# Patient Record
(demographics unavailable — no encounter records)

---

## 2025-03-18 NOTE — DATA REVIEWED
[FreeTextEntry1] : 2/25  A1c 8.3%, trig 270, HDL 40, LDL 64, Ca 9.1, GFR 96 9/24: A1c 8.1%, HDL 49, LDL 72, urine alb < 1.2, TSH 1.67, Ca 10.2, PTH 74, BSAP 15.8. 9/23  Ca 10.8, GFR 92, phos 2.8, urine alb < 1.2 8/23  A1c 8.7%, fructosamine 369, tot chol 159, trig 210, HDL 54, LDL 70, 25D 24.9, TSH 1.45, BSAP 21.7 5/23  A1c 8.4% POC 4/23  Ca 10.7,  GFR 96, PTH 81 3/23  Ca 10.2,  9/22  A1c 6.4%  bone density, 12/20 ([facility]) fem neck T -2.9, prev -2.8 (2019) tot hip T -2.5, prev -2.1 (2019) L2-L3  T -2.2, prev -2.4 (2019)

## 2025-03-18 NOTE — ASSESSMENT
[FreeTextEntry1] : Osteoporosis s/p hip fracture.  s/p Evenity and now on Prolia continue  Prolia every 6 months. Check bone density. continue calcium and vitamin D supplementation.   Diabetes, suboptimal.  Goal A1c < 8% Continue Novolog mix 70/30,  18 units with breakfast and 22 units for dinner. I agree with adding Ozempic to help reduce insulin requirements and reduce her appetite.  Start Ozempic 0.25mg/week for 3 weeks and then titrate to 0.5mg/week.  Advised to eat slowly, reduce portions, avoid eating late and avoid heavy foods,  to prevent potential nausea and GERD side effects.   Test glucose at least 2x/day:  in morning, early evening (before dinner) and at bedtime.   If morning sugars are trending below 100, will reduce pm dose of Novolog mix.  If pre dinner dose is trending lower, then will reduce am dose of Novolog mix.  RTO 4 months

## 2025-03-18 NOTE — HISTORY OF PRESENT ILLNESS
[FreeTextEntry1] : This visit was provided via telehealth using real-time 2-way audio visual technology. The patient, Soraya Benz,  was located at home ([place]), at the time of the visit. The provider, LACHO VELEZ, was located at the medical office located in [place], [place],   at the time of the visit. The patient, Soraya Benz,  and Provider participated in the telehealth encounter.  Verbal consent for telehealth services was given  by the patient,   Soraya Benz.   also present. Sugars have been 150-170 range in the mornings, and she is always hungry.  She isn't testing at night. Daughter is on Ozempic and she wants to know if it is appropriate for her. She is a stress eater. She injured her shoulder (rotator cuff, bursitis, arthritis). She is taking calcium and vitamin D.  PCP gave her Prolia injection on Feb 10. labs reviewed 2/25  A1c 8.3%, trig 270, HDL 40, LDL 64, Ca 9.1, GFR 96    PMH:  type 2 diabetes  ESRD s/p transplant 2/23 stroke osteoporosis.  R hip fracture 11/23.    4 doses of Prolia, in 2021?  Evenity 1/24 - 12/24.  Prolia 2/25  Meds Novolog mix 70/30, 18 units with breakfast and 22 units with dinner atorvastatin 40mg prednisone 5mg, Cellcept (to be changed, giving her a lot of diarrhea and fecal incontinence) amlodipine 5mg, furosemide 20mg Effexor calcium and vitamin D

## 2025-03-25 NOTE — PLAN
[FreeTextEntry1] : 72 year old woman s/p Living donor kidney transplant on 2/22/23 from 44 yr old man, altruistic donor.  1. s/p LURT kidney transplant on 2/22/23 with excellent allograft function. 2. Immunosuppression- on Envarsus 2 mg po daily (goal 4-6) Myfortic 360 mg po bid and prednisone 5 mg po daily. 3. Hypertension- Controlled without meds 4. DM type II - follows with endocrinology

## 2025-03-25 NOTE — HISTORY OF PRESENT ILLNESS
[FreeTextEntry1] : 72 year old woman with h/o FSGS, hemorrhagic CVA with diminished right vision, ,DM type II, HTN, wand on HD since 2022. Underwent Living donor kidney transplant on 2/22/23 from 44 yr old man, altruistic donor.  She fell in the fall of last year and sustained a hip fracture, which is being managed conservatively and was advised rest and to avoid weight bearing. Denies any fever, chills, dysuria, LE edema, cough, sob, chest pain , nausea, vomiting , diarrhea, constipation or any other active complaints.

## 2025-06-27 NOTE — HISTORY OF PRESENT ILLNESS
[FreeTextEntry1] :  also present. She is testing 2x/day, sugars are better since starting Ozempic. Mornings are really good,  range mostly.   After dinner sugars vary:  233, 144, 206, 155, 129, 194. She used to get cramping and diarrhea every Friday (injects on Tuesdays) but now the diarrhea episodes are completely random, but still occur once a week. She is not having any hypoglycemia symptoms. She does exercises at home, including for strength and balance, but doesn't go out much, is afraid to fall, that people might bump into her. She also still gets back pain that limits walking. She is taking calcium and vitamin D.  PCP gave her Prolia injection on Feb 10. labs reviewed 3/25:  Ca 9.9, eGFR 94 bone density reviewed from 5/25:  improved in hip and spine A1c today is 7.1%, improved.  PMH:  type 2 diabetes  ESRD s/p transplant 2/23 stroke osteoporosis.  R hip fracture 11/23.    4 doses of Prolia, in 2021?  Evenity 1/24 - 12/24.  Prolia 2/25  Meds Novolog mix 70/30, 18 units with breakfast and 26 units with dinner Ozempic 0.5mg/week atorvastatin 40mg prednisone 5mg, Cellcept (to be changed, giving her a lot of diarrhea and fecal incontinence) amlodipine 5mg, furosemide 20mg Effexor calcium and vitamin D

## 2025-06-27 NOTE — PHYSICAL EXAM
[Alert] : alert [No Acute Distress] : no acute distress [Normal Hearing] : hearing was normal [No LAD] : no lymphadenopathy [Clear to Auscultation] : lungs were clear to auscultation bilaterally [Normal S1, S2] : normal S1 and S2 [Regular Rhythm] : with a regular rhythm [No Edema] : no peripheral edema [Normal Sensation on Monofilament Testing] : normal sensation on monofilament testing of lower extremities [Normal Affect] : the affect was normal [Normal Mood] : the mood was normal [Acanthosis Nigricans] : no acanthosis nigricans [Foot Ulcers] : no foot ulcers [de-identified] : glucose 95, cheese stick, took insulin [de-identified] : thyroid not palpable [de-identified] : 1+ DP pulse [de-identified] : no onychomycoses

## 2025-06-27 NOTE — DATA REVIEWED
[FreeTextEntry1] : 2/25  A1c 8.3%, trig 270, HDL 40, LDL 64, Ca 9.1, GFR 96 9/24: A1c 8.1%, HDL 49, LDL 72, urine alb < 1.2, TSH 1.67, Ca 10.2, PTH 74, BSAP 15.8. 9/23  Ca 10.8, GFR 92, phos 2.8, urine alb < 1.2 8/23  A1c 8.7%, fructosamine 369, tot chol 159, trig 210, HDL 54, LDL 70, 25D 24.9, TSH 1.45, BSAP 21.7 5/23  A1c 8.4% POC 4/23  Ca 10.7,  GFR 96, PTH 81 3/23  Ca 10.2,  9/22  A1c 6.4%  bone density, 5/25 ([facility]) fem neck  T -3.1, prev -3.2 (2023)  -2.9 (2020),  -2.8 (2019) tot hip  T -2.7, prev -3.0 (2023),  -2.5 (2020),  -2.1 (2019) spine, L2-L4,  T -2.1, prev -2.3 (2023), -2.2 (2020),  -2.4 (2019)

## 2025-06-27 NOTE — ASSESSMENT
[FreeTextEntry1] : Osteoporosis s/p hip fracture.  s/p Evenity and now on Prolia.  Improved bone density. continue  Prolia every 6 months.  Next dose in August (getting with PCP), advised pt not to miss a dose and I anticipate treating at least 5 years. continue calcium and vitamin D supplementation.  continue strength and balance exercises.  Improved sugars, A1c 7.1% Titrate Ozempic to 1mg/week.  If diarrhea worsens with increased dose, she can dial back the dose. Continue Novolog mix 70/30,  18 units with breakfast and 22 units for dinner. Start CGM,  which is medically necessary for frequent glucose monitoring during treatment adjustments, and to determine insulin dosing and monitor for hypoglycemia.    [id number] 3 Plus system with reader ordered via [place] to [facility]. RTO 4 months